# Patient Record
Sex: FEMALE | Race: OTHER
[De-identification: names, ages, dates, MRNs, and addresses within clinical notes are randomized per-mention and may not be internally consistent; named-entity substitution may affect disease eponyms.]

---

## 2018-01-23 ENCOUNTER — HOSPITAL ENCOUNTER (OUTPATIENT)
Dept: HOSPITAL 47 - RADXRMAIN | Age: 78
Discharge: HOME | End: 2018-01-23
Attending: INTERNAL MEDICINE
Payer: MEDICARE

## 2018-01-23 DIAGNOSIS — R91.8: ICD-10-CM

## 2018-01-23 DIAGNOSIS — J44.9: ICD-10-CM

## 2018-01-23 DIAGNOSIS — J90: Primary | ICD-10-CM

## 2018-01-23 PROCEDURE — 71046 X-RAY EXAM CHEST 2 VIEWS: CPT

## 2018-01-23 NOTE — XR
EXAMINATION TYPE: XR chest 2V

 

DATE OF EXAM: 1/23/2018

 

COMPARISON: NONE

 

TECHNIQUE: PA and lateral views submitted.

 

HISTORY: Shortness of breath

 

FINDINGS:

Hyperinflation compatible COPD. Atherosclerotic change aorta. There is left lower lobe infiltrate and
 small effusion. No overt failure. Tiny right pleural effusion suspected.

 

IMPRESSION:

1. Left lower lobe infiltrate and small pleural effusion superimposed on a background of COPD.

2. Tiny right pleural effusion.